# Patient Record
Sex: FEMALE | Race: BLACK OR AFRICAN AMERICAN | URBAN - METROPOLITAN AREA
[De-identification: names, ages, dates, MRNs, and addresses within clinical notes are randomized per-mention and may not be internally consistent; named-entity substitution may affect disease eponyms.]

---

## 2017-04-27 ENCOUNTER — OCC HEALTH (OUTPATIENT)
Dept: OCCUPATIONAL MEDICINE | Age: 32
End: 2017-04-27
Attending: PHYSICIAN ASSISTANT

## 2017-05-01 ENCOUNTER — HOSPITAL ENCOUNTER (OUTPATIENT)
Dept: GENERAL RADIOLOGY | Age: 32
Discharge: HOME | End: 2017-05-01
Attending: PHYSICIAN ASSISTANT

## 2017-05-01 ENCOUNTER — OCC HEALTH (OUTPATIENT)
Dept: OCCUPATIONAL MEDICINE | Age: 32
End: 2017-05-01
Attending: PHYSICIAN ASSISTANT

## 2017-05-01 DIAGNOSIS — Z00.00 ANNUAL PHYSICAL EXAM: ICD-10-CM

## 2017-05-01 DIAGNOSIS — Z00.00 ANNUAL PHYSICAL EXAM: Primary | ICD-10-CM

## 2018-09-17 ENCOUNTER — APPOINTMENT (OUTPATIENT)
Dept: GENERAL RADIOLOGY | Age: 33
End: 2018-09-17
Attending: PHYSICIAN ASSISTANT
Payer: MEDICAID

## 2018-09-17 ENCOUNTER — HOSPITAL ENCOUNTER (EMERGENCY)
Age: 33
Discharge: HOME | End: 2018-09-17
Attending: EMERGENCY MEDICINE
Payer: MEDICAID

## 2018-09-17 VITALS
TEMPERATURE: 98 F | OXYGEN SATURATION: 97 % | SYSTOLIC BLOOD PRESSURE: 138 MMHG | HEIGHT: 67 IN | WEIGHT: 178 LBS | DIASTOLIC BLOOD PRESSURE: 92 MMHG | HEART RATE: 86 BPM | RESPIRATION RATE: 16 BRPM | BODY MASS INDEX: 27.94 KG/M2

## 2018-09-17 DIAGNOSIS — G89.29 CHRONIC LEFT-SIDED LOW BACK PAIN WITHOUT SCIATICA: Primary | ICD-10-CM

## 2018-09-17 DIAGNOSIS — M54.50 CHRONIC LEFT-SIDED LOW BACK PAIN WITHOUT SCIATICA: Primary | ICD-10-CM

## 2018-09-17 PROCEDURE — 72110 X-RAY EXAM L-2 SPINE 4/>VWS: CPT | Performed by: PHYSICIAN ASSISTANT

## 2018-09-17 PROCEDURE — 99283 EMERGENCY DEPT VISIT LOW MDM: CPT

## 2018-09-17 PROCEDURE — 96372 THER/PROPH/DIAG INJ SC/IM: CPT

## 2018-09-17 PROCEDURE — 99284 EMERGENCY DEPT VISIT MOD MDM: CPT

## 2018-09-17 RX ORDER — CYCLOBENZAPRINE HCL 10 MG
10 TABLET ORAL 3 TIMES DAILY PRN
Qty: 20 TABLET | Refills: 0 | Status: SHIPPED | OUTPATIENT
Start: 2018-09-17 | End: 2018-09-24

## 2018-09-17 RX ORDER — KETOROLAC TROMETHAMINE 30 MG/ML
60 INJECTION, SOLUTION INTRAMUSCULAR; INTRAVENOUS ONCE
Status: COMPLETED | OUTPATIENT
Start: 2018-09-17 | End: 2018-09-17

## 2018-09-17 NOTE — ED PROVIDER NOTES
[facility]: [facility] In [place]    History   Patient presents with:  Back Pain (musculoskeletal)    Stated Complaint: back pain    HPI    CHIEF COMPLAINT: Left lower back pain     HISTORY OF PRESENT ILLNESS: Patient is a 33-yea is reviewed and is noncontributory to the presenting problem, except as indicated as above. History reviewed. No pertinent past medical history. History reviewed. No pertinent surgical history.         Social History    Tobacco Use      Smoking status Technologist)  Patient has had left lower back pain for 1 year that has been getting worse throughout the month. Patient states there was no injury. FINDINGS:    BONES:  Mild dextroscoliosis. There is a junctional vertebral body of S1.  DISC SPACES:  Mi (10 mg total) by mouth 3 (three) times daily as needed for Muscle spasms.   Qty: 20 tablet Refills: 0

## 2018-09-17 NOTE — ED PROVIDER NOTES
I reviewed that chart and discussed the case with the physician assistant. I have examined the patient and noted patient has some degenerative changes no acute fracture noted.   Good vertebral height noted, supportive care I agree with the physician assist

## 2019-11-30 ENCOUNTER — HOSPITAL ENCOUNTER (EMERGENCY)
Age: 34
Discharge: STILL ADMITTED OR ADMINISTRATIVE | End: 2019-11-30

## 2019-11-30 ENCOUNTER — HOSPITAL ENCOUNTER (EMERGENCY)
Age: 34
Discharge: HOME | End: 2019-11-30
Attending: EMERGENCY MEDICINE
Payer: COMMERCIAL

## 2019-11-30 VITALS
TEMPERATURE: 98 F | HEART RATE: 98 BPM | SYSTOLIC BLOOD PRESSURE: 134 MMHG | HEIGHT: 67 IN | OXYGEN SATURATION: 98 % | RESPIRATION RATE: 18 BRPM | DIASTOLIC BLOOD PRESSURE: 82 MMHG | BODY MASS INDEX: 28.88 KG/M2 | WEIGHT: 184 LBS

## 2019-11-30 DIAGNOSIS — J02.0 STREPTOCOCCAL SORE THROAT: Primary | ICD-10-CM

## 2019-11-30 PROCEDURE — 87430 STREP A AG IA: CPT | Performed by: EMERGENCY MEDICINE

## 2019-11-30 PROCEDURE — 99283 EMERGENCY DEPT VISIT LOW MDM: CPT

## 2019-11-30 RX ORDER — AMOXICILLIN AND CLAVULANATE POTASSIUM 875; 125 MG/1; MG/1
1 TABLET, FILM COATED ORAL 2 TIMES DAILY
Qty: 20 TABLET | Refills: 0 | Status: SHIPPED | OUTPATIENT
Start: 2019-11-30 | End: 2019-12-10

## 2019-12-01 NOTE — ED PROVIDER NOTES
Patient Seen in: [facility] Emergency Department In [place]      History   Patient presents with:  Sore Throat    Stated Complaint: sore throat    HPI    Patient is a 59-year-old female who states that for the past 3 days she has had cough, sore throat. Group A (*)     All other components within normal limits                  MDM     Patient was positive for strep. Patient prescription for antibiotics for home. Recommend follow-up for further evaluation. Return if new or worse symptoms.               D

## 2019-12-01 NOTE — ED INITIAL ASSESSMENT (HPI)
Pt c/o sore throat onset 3 days ago. Pt denies fevers or vomiting. C/o body aches and chills with slight cough yellow productive cough.

## (undated) NOTE — ED AVS SNAPSHOT
Lyle Mahan   MRN: [id number]    Department:  THE [facility] Emergency Department in [place]   Date of Visit:  11/30/2019           Disclosure     Insurance plans vary and the physician(s) referred by the ER may not be covered by your plan.  Please conta tell this physician (or your personal doctor if your instructions are to return to your personal doctor) about any new or lasting problems. The primary care or specialist physician will see patients referred from the [facility] Emergency Department.  Domingo Hadley

## (undated) NOTE — LETTER
Date & Time: 9/17/2018, 1:56 PM  Patient: Alexandra Rae  Encounter Provider(s):    Jaime Soares MD  See, Carlos Langleyma       To Whom It May Concern:    Alexandra Rae was seen and treated in our department on 9/17/2018.  She should not return to work until 5

## (undated) NOTE — ED AVS SNAPSHOT
Melani Huynha   MRN: [id number]    Department:  [facility] Emergency Department in [place]   Date of Visit:  9/17/2018           Disclosure     Insurance plans vary and the physician(s) referred by the ER may not be covered by your plan.  Please contact tell this physician (or your personal doctor if your instructions are to return to your personal doctor) about any new or lasting problems. The primary care or specialist physician will see patients referred from the [facility] Emergency Department.  Salvadore Skiff

## (undated) NOTE — LETTER
September 17, 2018    Patient: Hao Cole   Date of Visit: 9/17/2018       To Whom It May Concern:    Hao Cole was seen and treated in our emergency department on 9/17/2018. She should not return to work until 9/20/18.     If you have any ques